# Patient Record
Sex: FEMALE | URBAN - METROPOLITAN AREA
[De-identification: names, ages, dates, MRNs, and addresses within clinical notes are randomized per-mention and may not be internally consistent; named-entity substitution may affect disease eponyms.]

---

## 2023-11-09 ENCOUNTER — APPOINTMENT (RX ONLY)
Dept: URBAN - METROPOLITAN AREA CLINIC 88 | Facility: CLINIC | Age: 43
Setting detail: DERMATOLOGY
End: 2023-11-09

## 2024-01-11 ENCOUNTER — RX ONLY (OUTPATIENT)
Age: 44
Setting detail: RX ONLY
End: 2024-01-11

## 2024-01-11 ENCOUNTER — APPOINTMENT (RX ONLY)
Dept: URBAN - METROPOLITAN AREA CLINIC 88 | Facility: CLINIC | Age: 44
Setting detail: DERMATOLOGY
End: 2024-01-11

## 2024-01-11 DIAGNOSIS — Z01.818 ENCOUNTER FOR OTHER PREPROCEDURAL EXAMINATION: ICD-10-CM

## 2024-01-11 PROCEDURE — ? ADDITIONAL NOTES

## 2024-01-11 RX ORDER — ONDANSETRON 4 MG/1
TABLET, ORALLY DISINTEGRATING ORAL
Qty: 15 | Refills: 0 | Status: ERX | COMMUNITY
Start: 2024-01-11

## 2024-01-11 RX ORDER — OXYCODONE HYDROCHLORIDE AND ACETAMINOPHEN 5; 325 MG/1; MG/1
TABLET ORAL
Qty: 28 | Refills: 0 | Status: ERX | COMMUNITY
Start: 2024-01-11

## 2024-01-11 RX ORDER — APREPITANT 40 MG/1
CAPSULE ORAL
Qty: 1 | Refills: 0 | Status: ERX | COMMUNITY
Start: 2024-01-11

## 2024-01-11 RX ORDER — CYCLOBENZAPRINE HYDROCHLORIDE 5 MG/1
TABLET, FILM COATED ORAL
Qty: 30 | Refills: 0 | Status: ERX | COMMUNITY
Start: 2024-01-11

## 2024-01-11 RX ORDER — CEPHALEXIN 500 MG/1
CAPSULE ORAL
Qty: 21 | Refills: 2 | Status: ERX | COMMUNITY
Start: 2024-01-11

## 2024-01-11 NOTE — PROCEDURE: ADDITIONAL NOTES
Render Risk Assessment In Note?: no
Additional Notes: SX Date: 01/24/24 SX - Explant, lift.\\nPre-op in person 01/11/24\\n\\nHT: 5'9 / WT: 182 lbs\\nAllergies: NKA\\nPMH: Denies.\\nHospitalizations: C section.\\nMeds: Birth control pills (next period next 3 days).\\nSX HX: breast aug >15y/a, lift and tummy tuck 2009 aprox, C section 2021.\\nHx of Nausea after sx: No.\\nSmoke/Vape/Hookah: No.\\nETOH: once a month.\\nImplants: No.\\nPath: Surgeon's discretion.\\n\\nExparel: Yes.\\n\\nHusband will be taking care of her.\\nMeds sent to pharmacy x 4 and Percocet PRN pain \"acute pain exception\".\\nFollow up visits scheduled, patient notified.\\nLabwork CBC w Diff, CMP / PT/ PTT/ INR/ EKG / Photos done in office.\\nRecommendations prior to surgery were given.\\nStop blood thinners 2 weeks prior.\\nNicotine test day of sx, Pregnancy test.\\nPt verbalized understanding and agreement.

## 2024-01-12 ENCOUNTER — APPOINTMENT (RX ONLY)
Dept: URBAN - METROPOLITAN AREA CLINIC 88 | Facility: CLINIC | Age: 44
Setting detail: DERMATOLOGY
End: 2024-01-12

## 2024-01-25 ENCOUNTER — APPOINTMENT (RX ONLY)
Dept: URBAN - METROPOLITAN AREA CLINIC 88 | Facility: CLINIC | Age: 44
Setting detail: DERMATOLOGY
End: 2024-01-25

## 2024-01-25 PROCEDURE — 99024 POSTOP FOLLOW-UP VISIT: CPT

## 2024-01-25 PROCEDURE — ? COUNSELING - POST-OP CHECK, BREAST IMPLANT REMOVAL

## 2024-01-25 NOTE — PROCEDURE: COUNSELING - POST-OP CHECK, BREAST IMPLANT REMOVAL
Count Minor/Major Decisions Toward Mdm (Not Cosmetic)?: No
Add Postop Global No-Charge Code (43697)?: yes
Detail Level: Simple

## 2024-01-31 ENCOUNTER — APPOINTMENT (RX ONLY)
Dept: URBAN - METROPOLITAN AREA CLINIC 88 | Facility: CLINIC | Age: 44
Setting detail: DERMATOLOGY
End: 2024-01-31

## 2024-01-31 PROCEDURE — ? COUNSELING - POST-OP CHECK, BREAST IMPLANT REMOVAL

## 2024-01-31 PROCEDURE — 99024 POSTOP FOLLOW-UP VISIT: CPT

## 2024-01-31 NOTE — PROCEDURE: COUNSELING - POST-OP CHECK, BREAST IMPLANT REMOVAL
Detail Level: Simple
Add Postop Global No-Charge Code (26594)?: yes
Count Minor/Major Decisions Toward Mdm (Not Cosmetic)?: No

## 2024-02-02 ENCOUNTER — APPOINTMENT (RX ONLY)
Dept: URBAN - METROPOLITAN AREA CLINIC 88 | Facility: CLINIC | Age: 44
Setting detail: DERMATOLOGY
End: 2024-02-02

## 2024-02-02 PROCEDURE — ? COUNSELING - POST-OP CHECK, BREAST IMPLANT REMOVAL

## 2024-02-02 PROCEDURE — 99024 POSTOP FOLLOW-UP VISIT: CPT

## 2024-02-02 NOTE — PROCEDURE: COUNSELING - POST-OP CHECK, BREAST IMPLANT REMOVAL
Detail Level: Simple
Add Postop Global No-Charge Code (55618)?: yes
Count Minor/Major Decisions Toward Mdm (Not Cosmetic)?: No

## 2024-03-15 ENCOUNTER — APPOINTMENT (RX ONLY)
Dept: URBAN - METROPOLITAN AREA CLINIC 88 | Facility: CLINIC | Age: 44
Setting detail: DERMATOLOGY
End: 2024-03-15

## 2024-04-16 ENCOUNTER — APPOINTMENT (RX ONLY)
Dept: URBAN - METROPOLITAN AREA CLINIC 88 | Facility: CLINIC | Age: 44
Setting detail: DERMATOLOGY
End: 2024-04-16

## 2025-01-07 ENCOUNTER — APPOINTMENT (OUTPATIENT)
Dept: URBAN - METROPOLITAN AREA CLINIC 88 | Facility: CLINIC | Age: 45
Setting detail: DERMATOLOGY
End: 2025-01-07